# Patient Record
Sex: MALE | Race: WHITE | NOT HISPANIC OR LATINO | Employment: OTHER | URBAN - METROPOLITAN AREA
[De-identification: names, ages, dates, MRNs, and addresses within clinical notes are randomized per-mention and may not be internally consistent; named-entity substitution may affect disease eponyms.]

---

## 2024-06-10 ENCOUNTER — NURSE TRIAGE (OUTPATIENT)
Dept: FAMILY MEDICINE | Facility: CLINIC | Age: 44
End: 2024-06-10
Payer: COMMERCIAL

## 2024-06-10 ENCOUNTER — HOSPITAL ENCOUNTER (EMERGENCY)
Facility: CLINIC | Age: 44
Discharge: HOME | End: 2024-06-10
Attending: EMERGENCY MEDICINE | Admitting: EMERGENCY MEDICINE
Payer: COMMERCIAL

## 2024-06-10 ENCOUNTER — APPOINTMENT (OUTPATIENT)
Dept: ULTRASOUND IMAGING | Facility: CLINIC | Age: 44
End: 2024-06-10
Attending: EMERGENCY MEDICINE
Payer: COMMERCIAL

## 2024-06-10 VITALS
SYSTOLIC BLOOD PRESSURE: 140 MMHG | HEIGHT: 72 IN | OXYGEN SATURATION: 95 % | HEART RATE: 74 BPM | DIASTOLIC BLOOD PRESSURE: 96 MMHG | WEIGHT: 265 LBS | TEMPERATURE: 97 F | BODY MASS INDEX: 35.89 KG/M2 | RESPIRATION RATE: 18 BRPM

## 2024-06-10 DIAGNOSIS — K92.0 HEMATEMESIS WITH NAUSEA: ICD-10-CM

## 2024-06-10 DIAGNOSIS — F10.10 ALCOHOL ABUSE, EPISODIC DRINKING BEHAVIOR: ICD-10-CM

## 2024-06-10 DIAGNOSIS — K22.6 MALLORY-WEISS SYNDROME: ICD-10-CM

## 2024-06-10 DIAGNOSIS — K76.0 HEPATIC STEATOSIS: ICD-10-CM

## 2024-06-10 DIAGNOSIS — K76.89 LIVER CYST: ICD-10-CM

## 2024-06-10 DIAGNOSIS — R03.0 ELEVATED BP WITHOUT DIAGNOSIS OF HYPERTENSION: ICD-10-CM

## 2024-06-10 LAB
ABO/RH(D): NORMAL
ALBUMIN SERPL BCG-MCNC: 4.4 G/DL (ref 3.5–5.2)
ALP SERPL-CCNC: 94 U/L (ref 40–150)
ALT SERPL W P-5'-P-CCNC: 71 U/L (ref 0–70)
ANION GAP SERPL CALCULATED.3IONS-SCNC: 9 MMOL/L (ref 7–15)
ANTIBODY SCREEN: NEGATIVE
AST SERPL W P-5'-P-CCNC: 45 U/L (ref 0–45)
ATRIAL RATE - MUSE: 72 BPM
BASOPHILS # BLD AUTO: 0.1 10E3/UL (ref 0–0.2)
BASOPHILS NFR BLD AUTO: 1 %
BILIRUB SERPL-MCNC: 1.5 MG/DL
BUN SERPL-MCNC: 13.9 MG/DL (ref 6–20)
CALCIUM SERPL-MCNC: 9.2 MG/DL (ref 8.6–10)
CHLORIDE SERPL-SCNC: 102 MMOL/L (ref 98–107)
CREAT SERPL-MCNC: 1.16 MG/DL (ref 0.67–1.17)
DEPRECATED HCO3 PLAS-SCNC: 27 MMOL/L (ref 22–29)
DIASTOLIC BLOOD PRESSURE - MUSE: NORMAL MMHG
EGFRCR SERPLBLD CKD-EPI 2021: 79 ML/MIN/1.73M2
EOSINOPHIL # BLD AUTO: 0.1 10E3/UL (ref 0–0.7)
EOSINOPHIL NFR BLD AUTO: 1 %
ERYTHROCYTE [DISTWIDTH] IN BLOOD BY AUTOMATED COUNT: 12.2 % (ref 10–15)
ETHANOL SERPL-MCNC: <0.01 G/DL
GLUCOSE SERPL-MCNC: 98 MG/DL (ref 70–99)
HCT VFR BLD AUTO: 45.2 % (ref 40–53)
HGB BLD-MCNC: 15.5 G/DL (ref 13.3–17.7)
IMM GRANULOCYTES # BLD: 0.1 10E3/UL
IMM GRANULOCYTES NFR BLD: 1 %
INTERPRETATION ECG - MUSE: NORMAL
LIPASE SERPL-CCNC: 35 U/L (ref 13–60)
LYMPHOCYTES # BLD AUTO: 1.6 10E3/UL (ref 0.8–5.3)
LYMPHOCYTES NFR BLD AUTO: 23 %
MAGNESIUM SERPL-MCNC: 2.1 MG/DL (ref 1.7–2.3)
MCH RBC QN AUTO: 32.2 PG (ref 26.5–33)
MCHC RBC AUTO-ENTMCNC: 34.3 G/DL (ref 31.5–36.5)
MCV RBC AUTO: 94 FL (ref 78–100)
MONOCYTES # BLD AUTO: 0.4 10E3/UL (ref 0–1.3)
MONOCYTES NFR BLD AUTO: 6 %
NEUTROPHILS # BLD AUTO: 4.8 10E3/UL (ref 1.6–8.3)
NEUTROPHILS NFR BLD AUTO: 68 %
NRBC # BLD AUTO: 0 10E3/UL
NRBC BLD AUTO-RTO: 0 /100
P AXIS - MUSE: 52 DEGREES
PLATELET # BLD AUTO: 224 10E3/UL (ref 150–450)
POTASSIUM SERPL-SCNC: 4 MMOL/L (ref 3.4–5.3)
PR INTERVAL - MUSE: 170 MS
PROT SERPL-MCNC: 7 G/DL (ref 6.4–8.3)
QRS DURATION - MUSE: 80 MS
QT - MUSE: 412 MS
QTC - MUSE: 451 MS
R AXIS - MUSE: 38 DEGREES
RBC # BLD AUTO: 4.82 10E6/UL (ref 4.4–5.9)
SODIUM SERPL-SCNC: 138 MMOL/L (ref 135–145)
SPECIMEN EXPIRATION DATE: NORMAL
SYSTOLIC BLOOD PRESSURE - MUSE: NORMAL MMHG
T AXIS - MUSE: 3 DEGREES
VENTRICULAR RATE- MUSE: 72 BPM
WBC # BLD AUTO: 6.9 10E3/UL (ref 4–11)

## 2024-06-10 PROCEDURE — 36415 COLL VENOUS BLD VENIPUNCTURE: CPT | Performed by: EMERGENCY MEDICINE

## 2024-06-10 PROCEDURE — 83735 ASSAY OF MAGNESIUM: CPT | Performed by: EMERGENCY MEDICINE

## 2024-06-10 PROCEDURE — 83690 ASSAY OF LIPASE: CPT | Performed by: EMERGENCY MEDICINE

## 2024-06-10 PROCEDURE — 99285 EMERGENCY DEPT VISIT HI MDM: CPT | Mod: 25

## 2024-06-10 PROCEDURE — 96365 THER/PROPH/DIAG IV INF INIT: CPT

## 2024-06-10 PROCEDURE — 250N000011 HC RX IP 250 OP 636: Mod: JZ | Performed by: EMERGENCY MEDICINE

## 2024-06-10 PROCEDURE — 250N000009 HC RX 250: Performed by: EMERGENCY MEDICINE

## 2024-06-10 PROCEDURE — 96366 THER/PROPH/DIAG IV INF ADDON: CPT

## 2024-06-10 PROCEDURE — 82077 ASSAY SPEC XCP UR&BREATH IA: CPT | Performed by: EMERGENCY MEDICINE

## 2024-06-10 PROCEDURE — 76705 ECHO EXAM OF ABDOMEN: CPT

## 2024-06-10 PROCEDURE — 85025 COMPLETE CBC W/AUTO DIFF WBC: CPT | Performed by: EMERGENCY MEDICINE

## 2024-06-10 PROCEDURE — 86900 BLOOD TYPING SEROLOGIC ABO: CPT | Performed by: EMERGENCY MEDICINE

## 2024-06-10 PROCEDURE — 80053 COMPREHEN METABOLIC PANEL: CPT | Performed by: EMERGENCY MEDICINE

## 2024-06-10 PROCEDURE — 93005 ELECTROCARDIOGRAM TRACING: CPT

## 2024-06-10 PROCEDURE — 258N000003 HC RX IP 258 OP 636: Mod: JZ | Performed by: EMERGENCY MEDICINE

## 2024-06-10 RX ORDER — PANTOPRAZOLE SODIUM 40 MG/1
40 TABLET, DELAYED RELEASE ORAL DAILY
Qty: 30 TABLET | Refills: 0 | Status: SHIPPED | OUTPATIENT
Start: 2024-06-10 | End: 2024-07-10

## 2024-06-10 RX ADMIN — FOLIC ACID: 5 INJECTION, SOLUTION INTRAMUSCULAR; INTRAVENOUS; SUBCUTANEOUS at 14:48

## 2024-06-10 ASSESSMENT — COLUMBIA-SUICIDE SEVERITY RATING SCALE - C-SSRS
2. HAVE YOU ACTUALLY HAD ANY THOUGHTS OF KILLING YOURSELF IN THE PAST MONTH?: NO
6. HAVE YOU EVER DONE ANYTHING, STARTED TO DO ANYTHING, OR PREPARED TO DO ANYTHING TO END YOUR LIFE?: NO
1. IN THE PAST MONTH, HAVE YOU WISHED YOU WERE DEAD OR WISHED YOU COULD GO TO SLEEP AND NOT WAKE UP?: NO

## 2024-06-10 ASSESSMENT — ACTIVITIES OF DAILY LIVING (ADL)
ADLS_ACUITY_SCORE: 35

## 2024-06-10 NOTE — ED TRIAGE NOTES
Patient has hx of alcohol abuse and stopped drinking 1 week ago, last night drank 0.5 Liter of vodka. Today patient started to have abd pain, nausea, and vomiting with a blood clot in it.

## 2024-06-10 NOTE — DISCHARGE INSTRUCTIONS
Stop drinking alcohol.      Avoid aspirin, ibuprofen/advil/aleve, since these can upset your stomach or cause bleeding.    It is fine to take small amounts of Tylenol/Acetaminophen as needed for pain

## 2024-06-10 NOTE — TELEPHONE ENCOUNTER
"Nurse Triage SBAR    Is this a 2nd Level Triage? NO    Situation: Patient is calling for vomiting up blood this morning.    Background: Patient has had recent relapse of alcoholism.  Patient was sober for 14 years but relapsed due to losing father 2 years ago.  Had stopped for a week and then drank a half a bottle last night.  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    Assessment:   Vomiting blood 2x.  Described as two clumps. Approximately \"half fist\" first time, \"1/4 fist\" second time  Patient unsure where it is coming from.  Urine dark  Throwing up water  Recent low appetite  Sternum a tad tight, feels epigastric, \"like I need to burp something\"    Protocol Recommended Disposition:   Go to ED Now    Recommendation: Go to ED now.  Advised patient to be seen in the ED now given symptoms.  Patient initially concerned with paying given lack of insurance.  Advised patient that current health is more important to get checked now.  Patient will go to ED now to be evaluated.     Torsten WADE RN      Reason for Disposition   Unclear to triager if the patient is coughing up blood or vomiting blood    Additional Information   Negative: SEVERE difficulty breathing (e.g., struggling for each breath, speaks in single words)   Negative: Chest pain and difficulty breathing   Negative: Bluish (or gray) lips or face now   Negative: Passed out (i.e., lost consciousness, collapsed and was not responding)   Negative: Shock suspected (e.g., cold/pale/clammy skin, too weak to stand, low BP, rapid pulse)   Negative: Difficult to awaken or acting confused (e.g., disoriented, slurred speech)   Negative: Recent chest injury (i.e., past 24 hours)   Negative: Coughed up blood and large amount (Such as: 'a half cup of blood')   Negative: Sounds like a life-threatening emergency to the triager   Negative: MODERATE difficulty " breathing (e.g., speaks in phrases, SOB even at rest, pulse 100-120) and still present when not coughing   Negative: Chest pain    Protocols used: Coughing Up Blood-A-[place]

## 2024-06-10 NOTE — ED PROVIDER NOTES
Emergency Department Note      History of Present Illness     Chief Complaint  Hematemesis    HPI  Chris Salgado is a 44 year old male presenting to the ED for evaluation of hematemesis. The patient reports that he is a former alcoholic in his 20s and 30s, but was sober for 13 years. His dad passed away two years ago, and he has been struggling with relapses ever since. He stopped drinking again about a week ago, and noticed that his urine seemed dark. He adds that this week has been every stressful and busy with work. Last night, he was trying to find some 's gear for work, and found some booze. He drank about a half liter of vodka last night, and ate a whole pizza by himself. This morning when he woke up, he had some abdominal cramping and felt constipated. As time went by, he began vomiting up a watery fluid. After retching several times, he threw up two large blood clots as well, causing him to come to the ED. He denies a history of alcohol withdrawal seizures or black/bloody stools. He also denies suicidal ideation, but says he feels depressed while drinking. The patient is not interested in treatment for his alcohol abuse or mental health concerns.     Independent Historian  Wife as detailed above, who assist with history    Review of External Notes  None    Past Medical History   Medical History and Problem List  Alcohol abuse     Medications  The patient is currently on no regular medications.    Physical Exam   Patient Vitals for the past 24 hrs:   BP Temp Temp src Pulse Resp SpO2 Height Weight   06/10/24 1637 -- -- -- -- -- 95 % -- --   06/10/24 1636 -- -- -- -- -- 96 % -- --   06/10/24 1635 -- -- -- -- -- 97 % -- --   06/10/24 1634 -- -- -- -- -- 97 % -- --   06/10/24 1633 -- -- -- -- -- 97 % -- --   06/10/24 1632 -- -- -- -- -- 96 % -- --   06/10/24 1631 -- -- -- -- -- 97 % -- --   06/10/24 1630 (!) 140/96 -- -- 74 -- 97 % -- --   06/10/24 1556 (!) 137/90 -- -- -- -- 98 % -- --   06/10/24 2903  (!) 146/103 -- -- 77 -- 98 % -- --   06/10/24 1232 -- -- -- -- -- -- 1.829 m (6') 120.2 kg (265 lb)   06/10/24 1225 (!) 169/106 97  F (36.1  C) Temporal 72 18 98 % -- --     Physical Exam  Nursing note and vitals reviewed.  Constitutional:  Appears well-developed and well-nourished.   HENT:   Head:    Atraumatic.   Mouth/Throat:   Oropharynx is clear and moist. No oropharyngeal exudate.   Eyes:    Pupils are equal, round, and reactive to light.   Neck:    Normal range of motion. Neck supple.      No tracheal deviation present. No thyromegaly present.   Cardiovascular:  Normal rate, regular rhythm, no murmur   Pulmonary/Chest: Breath sounds are clear and equal without wheezes or crackles.  Abdominal:   Soft. Bowel sounds are normal. Exhibits no distension and      no mass. There is no tenderness.      There is no rebound and no guarding.   Musculoskeletal:  Exhibits no edema.   Lymphadenopathy:  No cervical adenopathy.   Neurological:   Alert and oriented to person, place, and time.   Skin:    Skin is warm and dry. No rash noted. No pallor.     Diagnostics   Lab Results   Labs Ordered and Resulted from Time of ED Arrival to Time of ED Departure   COMPREHENSIVE METABOLIC PANEL - Abnormal       Result Value    Sodium 138      Potassium 4.0      Carbon Dioxide (CO2) 27      Anion Gap 9      Urea Nitrogen 13.9      Creatinine 1.16      GFR Estimate 79      Calcium 9.2      Chloride 102      Glucose 98      Alkaline Phosphatase 94      AST 45      ALT 71 (*)     Protein Total 7.0      Albumin 4.4      Bilirubin Total 1.5 (*)    LIPASE - Normal    Lipase 35     ETHYL ALCOHOL LEVEL - Normal    Alcohol ethyl <0.01     MAGNESIUM - Normal    Magnesium 2.1     CBC WITH PLATELETS AND DIFFERENTIAL    WBC Count 6.9      RBC Count 4.82      Hemoglobin 15.5      Hematocrit 45.2      MCV 94      MCH 32.2      MCHC 34.3      RDW 12.2      Platelet Count 224      % Neutrophils 68      % Lymphocytes 23      % Monocytes 6      %  Eosinophils 1      % Basophils 1      % Immature Granulocytes 1      NRBCs per 100 WBC 0      Absolute Neutrophils 4.8      Absolute Lymphocytes 1.6      Absolute Monocytes 0.4      Absolute Eosinophils 0.1      Absolute Basophils 0.1      Absolute Immature Granulocytes 0.1      Absolute NRBCs 0.0     TYPE AND SCREEN, ADULT    ABO/RH(D) O POS      Antibody Screen Negative      SPECIMEN EXPIRATION DATE [id number]     ABO/RH TYPE AND SCREEN     Imaging  US Abdomen Limited   Final Result   IMPRESSION:   1.  Diffuse hepatic steatosis. Multiple simple appearing cysts   measuring up to 2.2 cm.      KOURTNEY FITZGERALD MD            SYSTEM ID:  [id number]      Report per radiology.     EKG   ECG results from 06/10/24   EKG 12-lead, tracing only     Value    Systolic Blood Pressure     Diastolic Blood Pressure     Ventricular Rate 72    Atrial Rate 72    IL Interval 170    QRS Duration 80        QTc 451    P Axis 52    R AXIS 38    T Axis 3    Interpretation ECG      Sinus rhythm with sinus arrhythmia  Normal ECG  No previous ECGs available  Confirmed by GENERATED REPORT, COMPUTER ([id number]),  Mariposa Sharma ([id number]) on 6/10/2024 3:29:58 PM       Independent Interpretation  None    ED Course    Medications Administered  Medications   sodium chloride 0.9 % 1,000 mL with Infuvite Adult 10 mL, thiamine 100 mg, folic acid 1 mg, magnesium sulfate 2 g infusion ( Intravenous $New Bag 6/10/24 5980)     Procedures  Procedures     Discussion of Management  None    Social Determinants of Health adding to complexity of care  Healthcare Access/Compliance, Stress/Adjustment Disorders, and alcohol use disorder    ED Course  ED Course as of 06/10/24 1810   Mon Adrian 10, 2024   1230 I obtained history and examined the patient as noted above.     1632 I rechecked the patient and explained findings.    I rechecked the patient and he is feeling normally without any further nausea or vomiting.    Medical Decision Making / Diagnosis   CMS  Diagnoses: None    MIPS     None    [facility]  Chris Salgado is a 44 year old male with a history of alcohol use disorder who arrives to the emergency department due to hematemesis.  His symptoms sound most consistent with a Berkley-Bob tear since he describes nonbloody emesis followed by significant retching and then the onset of hematemesis.  He does not have any symptoms concerning for perforated ulcer.  He has a normal hemoglobin, blood pressure and heart rate without any clinical signs of ongoing bleeding.  He has not had any symptoms concerning for rectal bleeding or melena.  He was given Protonix IV here and I recommended that he be admitted to the hospital for serial hemoglobins and further monitoring for possible ongoing upper GI bleeding especially considering his alcohol use disorder which puts him at risk for upper GI bleeding such as bleeding gastritis or bleeding ulcer, however he declines admission to the hospital at this time.  The patient's wife states that they live very close and that they will return if he has any problems.  I discussed with them that he needs to return if he vomits blood again or if he has any signs of ongoing bleeding such as abdominal pain, heart racing, bloody stools, melena, lightheadedness, pale skin or any other concerning problems.  The patient voices understanding and agreement.  He was prescribed Protonix to take daily and told to stop using alcohol.  I discussed the incidental ultrasound findings of the hepatic steatosis and liver cyst with the patient.  There is no sign of pancreatitis.  I discussed the incidental findings of his elevated LFTs with the patient and he was told to stop using alcohol and follow-up with gastroenterology regarding these LFT elevations and his hepatic steatosis found on ultrasound imaging as well as the hematemesis.  I discussed with the patient that I ordered an upper GI endoscopy as well as GI  referral.  I also ordered a primary  care referral since he does not have a primary care doctor.  There was no sign of alcohol withdrawal at this time and he is not intoxicated.    Disposition  The patient was discharged.     ICD-10 Codes:    ICD-10-CM    1. 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 Hepatic steatosis  K76.0       4. Alcohol abuse, episodic drinking behavior  F10.10 Primary Care Referral      5. Liver cyst  K76.89       6. Elevated BP without diagnosis of hypertension  R03.0 Primary Care Referral         Discharge Medications  Discharge Medication List as of 6/10/2024  4:44 PM        START taking these medications    Details   pantoprazole (PROTONIX) 40 MG EC tablet Take 1 tablet (40 mg) by mouth daily for 30 doses, Disp-30 tablet, R-0, E-Prescribe           Scribe Disclosure:  Mirella RODRIGUEZ, am serving as a scribe at 12:34 PM on 6/10/2024 to document services personally performed by Marian Gonzalez MD based on my observations and the provider's statements to me.        Marian Gonzalez MD  06/10/24 2041